# Patient Record
Sex: MALE | Race: WHITE | NOT HISPANIC OR LATINO | Employment: OTHER | URBAN - METROPOLITAN AREA
[De-identification: names, ages, dates, MRNs, and addresses within clinical notes are randomized per-mention and may not be internally consistent; named-entity substitution may affect disease eponyms.]

---

## 2018-01-16 ENCOUNTER — TELEPHONE (OUTPATIENT)
Dept: ORTHOPEDIC SURGERY | Facility: CLINIC | Age: 63
End: 2018-01-16

## 2018-01-18 ENCOUNTER — OFFICE VISIT (OUTPATIENT)
Dept: ORTHOPEDIC SURGERY | Facility: CLINIC | Age: 63
End: 2018-01-18

## 2018-01-18 VITALS — HEIGHT: 69 IN | TEMPERATURE: 97.6 F | WEIGHT: 221 LBS | BODY MASS INDEX: 32.73 KG/M2

## 2018-01-18 DIAGNOSIS — S82.62XA CLOSED DISPLACED FRACTURE OF LATERAL MALLEOLUS OF LEFT FIBULA, INITIAL ENCOUNTER: ICD-10-CM

## 2018-01-18 DIAGNOSIS — S99.912A LEFT ANKLE INJURY, INITIAL ENCOUNTER: Primary | ICD-10-CM

## 2018-01-18 PROCEDURE — 73610 X-RAY EXAM OF ANKLE: CPT | Performed by: ORTHOPAEDIC SURGERY

## 2018-01-18 PROCEDURE — 99204 OFFICE O/P NEW MOD 45 MIN: CPT | Performed by: ORTHOPAEDIC SURGERY

## 2018-01-18 PROCEDURE — 27786 TREATMENT OF ANKLE FRACTURE: CPT | Performed by: ORTHOPAEDIC SURGERY

## 2018-01-18 RX ORDER — RITONAVIR 100 MG/1
100 TABLET, FILM COATED ORAL DAILY
Refills: 3 | COMMUNITY
Start: 2017-12-28

## 2018-01-18 RX ORDER — LISINOPRIL 5 MG/1
5 TABLET ORAL DAILY
Refills: 3 | COMMUNITY
Start: 2017-12-28

## 2018-01-18 RX ORDER — LORATADINE 10 MG/1
1 TABLET ORAL DAILY
Refills: 0 | COMMUNITY
Start: 2017-12-28

## 2018-01-18 RX ORDER — ETRAVIRINE 200 MG/1
1 TABLET ORAL DAILY
Refills: 3 | COMMUNITY
Start: 2017-12-28

## 2018-01-18 RX ORDER — NIACIN 500 MG
500 TABLET ORAL NIGHTLY
COMMUNITY

## 2018-01-18 RX ORDER — RALTEGRAVIR 400 MG/1
2 TABLET, FILM COATED ORAL DAILY
Refills: 3 | COMMUNITY
Start: 2017-12-28

## 2018-01-18 RX ORDER — DIAZEPAM 5 MG/1
5 TABLET ORAL DAILY
Refills: 3 | COMMUNITY
Start: 2017-12-28

## 2018-01-18 RX ORDER — ERGOCALCIFEROL 1.25 MG/1
1 CAPSULE ORAL
Refills: 1 | COMMUNITY
Start: 2017-12-28

## 2018-01-18 RX ORDER — TRAMADOL HYDROCHLORIDE 50 MG/1
TABLET ORAL
Qty: 45 TABLET | Refills: 0 | Status: SHIPPED | OUTPATIENT
Start: 2018-01-18

## 2018-01-18 RX ORDER — LEVOTHYROXINE SODIUM 0.05 MG/1
1 TABLET ORAL DAILY
Refills: 3 | COMMUNITY
Start: 2017-12-28

## 2018-01-18 RX ORDER — PRENATAL WITH FERROUS FUM AND FOLIC ACID 3080; 920; 120; 400; 22; 1.84; 3; 20; 10; 1; 12; 200; 27; 25; 2 [IU]/1; [IU]/1; MG/1; [IU]/1; MG/1; MG/1; MG/1; MG/1; MG/1; MG/1; UG/1; MG/1; MG/1; MG/1; MG/1
1 TABLET ORAL DAILY
Refills: 3 | COMMUNITY
Start: 2017-12-28

## 2018-01-18 RX ORDER — RANITIDINE 300 MG/1
1 TABLET ORAL DAILY
Refills: 0 | COMMUNITY
Start: 2017-12-28

## 2018-01-18 RX ORDER — DARUNAVIR 800 MG/1
800 TABLET, FILM COATED ORAL DAILY
Refills: 3 | COMMUNITY
Start: 2017-12-28

## 2018-01-18 RX ORDER — HYDROCODONE BITARTRATE AND ACETAMINOPHEN 5; 325 MG/1; MG/1
1 TABLET ORAL EVERY 6 HOURS PRN
Qty: 40 TABLET | Refills: 0 | Status: SHIPPED | OUTPATIENT
Start: 2018-01-18 | End: 2018-01-18

## 2018-01-18 RX ORDER — PRAVASTATIN SODIUM 40 MG
20 TABLET ORAL NIGHTLY
Refills: 3 | COMMUNITY
Start: 2017-12-28

## 2018-01-18 RX ORDER — LEVETIRACETAM 500 MG/1
1 TABLET ORAL 2 TIMES DAILY
Refills: 2 | COMMUNITY
Start: 2017-12-28

## 2018-01-18 RX ORDER — CHOLECALCIFEROL (VITAMIN D3) 125 MCG
500 CAPSULE ORAL DAILY
COMMUNITY

## 2018-01-18 RX ORDER — CLOPIDOGREL BISULFATE 75 MG/1
1 TABLET ORAL DAILY
Refills: 0 | COMMUNITY
Start: 2017-12-28

## 2018-01-18 RX ORDER — LANOLIN ALCOHOL/MO/W.PET/CERES
50 CREAM (GRAM) TOPICAL DAILY
COMMUNITY

## 2018-01-18 NOTE — PROGRESS NOTES
New Patient Complaint      Patient: Tacos Mcknight  YOB: 1955 62 y.o. male  Medical Record Number: [id number]    Chief Complaints: I hurt my ankle    History of Present Illness: Patient injured his left ankle and 1/7/18 when he fell at home being up from trying to fix something under his desk.  He continued to use his brace and weight-bear for transfers with persistent severe constant stabbing pain with redness bruising and swelling over the lateral aspect left ankle.  He was seen at [facility] on 1/11/18 diagnosis left ankle fracture.  He is seen here today for further evaluation.    He has a significant history for multiple strokes in the past and has limited use of the left side.  Really is not ambulatory but does stand for transfers and occasionally stands for brief periods of time without assistive devices.          HPI    Allergies:   Allergies   Allergen Reactions   • Iodine    • Shellfish-Derived Products        Medications:   No current outpatient prescriptions on file prior to visit.     No current facility-administered medications on file prior to visit.        Past Medical History:   Diagnosis Date   • Fracture, foot     Great Toe - Right Foot   • HIV (human immunodeficiency virus infection)    • Seizures    • Sleep apnea    • Stroke     x2     Past Surgical History:   Procedure Laterality Date   • BACK SURGERY      lumbar laminectomy   • BRAIN BIOPSY     • TOE SURGERY Right 1990   • TONSILLECTOMY AND ADENOIDECTOMY       Social History     Occupational History   • Not on file.     Social History Main Topics   • Smoking status: Former Smoker     Quit date: 1/18/2009   • Smokeless tobacco: Never Used   • Alcohol use No   • Drug use: No   • Sexual activity: Defer      Social History     Social History Narrative   • No narrative on file     Family History   Problem Relation Age of Onset   • Cancer Mother    • No Known Problems Father    • Cancer Sister      breast   • No Known  "Problems Brother    • No Known Problems Maternal Aunt    • No Known Problems Maternal Uncle    • No Known Problems Paternal Aunt    • No Known Problems Paternal Uncle    • Cancer Maternal Grandmother    • No Known Problems Maternal Grandfather    • Diabetes Paternal Grandmother    • No Known Problems Paternal Grandfather    • Anesthesia problems Neg Hx    • Broken bones Neg Hx    • Clotting disorder Neg Hx    • Collagen disease Neg Hx    • Dislocations Neg Hx    • Osteoporosis Neg Hx    • Rheumatologic disease Neg Hx    • Scoliosis Neg Hx    • Severe sprains Neg Hx        Review of Systems: 14 point review of systems performed, positive pertinent findings identified in HPI. All remaining systems negative Except dry eyes, diarrhea, difficulty sleeping    Review of Systems      Physical Exam:   Vitals:    01/18/18 1409   Temp: 97.6 °F (36.4 °C)   TempSrc: Temporal Artery    Weight: 100 kg (221 lb)   Height: 175.3 cm (69\")     Physical Exam   Constitutional: pleasant, well developed  he is seen with a friend present is seated in a wheelchair  Eyes: sclera non icteric  Hearing : adequate for exam  Cardiovascular: palpable pulses in left foot, left calf/ thigh NT without sign of DVT  Respiratoy: breathing unlabored   Neurological: grossly sensate to LT throughout left LE  Psychiatric: oriented with normal mood and affect.   Lymphatic: No palpable popliteal lymphadenopathy left LE  Skin: intact throughout left leg/foot  Musculoskeletal: There is left ankle shows minimal swelling laterally none medially.  There is tenderness to palpation over the fracture site but no focal pain medially.  There was diminished sensation in the foot but he was able to dorsiflex and plantarflex the ankle as well as the toes to some degree.  Nontender over the dorsum of the midfoot.  Physical Exam  Ortho Exam    Radiology: 3 views left ankle ordered to evaluate alignment reviewed with the patient and his friend and there are no prior x-rays " available for comparison shows a minimally displaced fracture of left lateral malleolus    Assessment/Plan: Left lateral malleolus fracture.  We discussed operative and nonoperative treatment options and he really does not want to have surgery unless was absolutely 100% necessary.  I reviewed this with him that given the time since injury with only minimal amount of displacement and his limited use of the ankle mainly only for standing for brief periods and transferring in a brace that he already has that do not feel that we absolutely had to do surgery.  Also he's been on Plavix for to severe strokes and has never come off of this and he said he would be very reluctant to do so for fear further stroke which is certainly understand.    I think this can be treated nonoperatively from a reasonable standpoint.  He was fitted with a boot today to use as a cast basically and understands to remove it at least every hour or 2 to check his skin.    He was fitted with an air stirrup to sleep with.    He understands if he has further shift or change in alignment may require surgical treatment.    Given a prescription today for tramadol 50 mg 1 by mouth every 6 hours as needed for pain #45 with no refills Controlled substance treatment options discussed in detail.  Patient's signed consent to medical options on file.  GREGORY form in chart.  Risks of narcotic medication usage outlined.  Possibility of physical and psychological dependence and abuse, especially long-term, emphasized to the patient.    Follow-up in 1 week x-rays left ankle

## 2018-01-26 ENCOUNTER — OFFICE VISIT (OUTPATIENT)
Dept: ORTHOPEDIC SURGERY | Facility: CLINIC | Age: 63
End: 2018-01-26

## 2018-01-26 VITALS — HEIGHT: 69 IN | TEMPERATURE: 97.8 F | WEIGHT: 221 LBS | BODY MASS INDEX: 32.73 KG/M2

## 2018-01-26 DIAGNOSIS — S82.62XD CLOSED DISPLACED FRACTURE OF LATERAL MALLEOLUS OF LEFT FIBULA WITH ROUTINE HEALING, SUBSEQUENT ENCOUNTER: Primary | ICD-10-CM

## 2018-01-26 PROCEDURE — 73610 X-RAY EXAM OF ANKLE: CPT | Performed by: ORTHOPAEDIC SURGERY

## 2018-01-26 PROCEDURE — 99024 POSTOP FOLLOW-UP VISIT: CPT | Performed by: ORTHOPAEDIC SURGERY

## 2018-01-26 NOTE — PROGRESS NOTES
"Ankle Follow Up      Patient: Tacos Mcknight    YOB: 1955 62 y.o. male    Chief Complaints: Ankle pain    History of Present Illness: Injured left ankle on 1/7/18 seen here on 1/18/18.  That time reviewed treatment options with neutral decision made to treat this nonoperatively.  Since then he said his pain is now gone he's been using his boot and really only stands for transfers.  HPI    ROS: No ankle pain  Past Medical History:   Diagnosis Date   • Fracture, foot     Great Toe - Right Foot   • HIV (human immunodeficiency virus infection)    • Seizures    • Sleep apnea    • Stroke     x2       Physical Exam:   Vitals:    01/26/18 1310   Temp: 97.8 °F (36.6 °C)   TempSrc: Temporal Artery    Weight: 100 kg (221 lb)   Height: 175.3 cm (69\")     Well developed with normal mood.He is with his friend.  Left ankle shows skin to be intact throughout the ankle.  Minimal if any swelling and no focal tenderness to palpation.  Left calf is non-tender without swelling or sign of DVT      Radiology: 3 views left ankle ordered to evaluate fracture reviewed and compared with previous x-rays.  There is been no change in alignment to the distal fibular fracture and I do not appreciate significant widening medially or significant lateral translation of the talus.      Assessment/Plan:  1.  Nonoperative treatment left distal fibula fracture.      The time since his injury as well as the overall alignment I would not recommend surgical treatment at this time.    He'll continue with his boot for standing and transfer activities and understands to check his skin every hour or 2.    He says the air stirrup has been coming off at night.  He may try to adjust this really put a much larger sock over it.    I will see him back in 2 weeks' x-rays of his left ankle  "

## 2018-02-09 ENCOUNTER — OFFICE VISIT (OUTPATIENT)
Dept: ORTHOPEDIC SURGERY | Facility: CLINIC | Age: 63
End: 2018-02-09

## 2018-02-09 VITALS — TEMPERATURE: 97.8 F | HEIGHT: 69 IN | BODY MASS INDEX: 32.73 KG/M2 | WEIGHT: 221 LBS

## 2018-02-09 DIAGNOSIS — S82.62XD CLOSED DISPLACED FRACTURE OF LATERAL MALLEOLUS OF LEFT FIBULA WITH ROUTINE HEALING, SUBSEQUENT ENCOUNTER: Primary | ICD-10-CM

## 2018-02-09 PROCEDURE — 99024 POSTOP FOLLOW-UP VISIT: CPT | Performed by: ORTHOPAEDIC SURGERY

## 2018-02-09 PROCEDURE — 73610 X-RAY EXAM OF ANKLE: CPT | Performed by: ORTHOPAEDIC SURGERY

## 2018-02-09 NOTE — PROGRESS NOTES
"Ankle Follow Up      Patient: Tacos Mcknight    YOB: 1955 62 y.o. male    Chief Complaints: Ankle pain    History of Present Illness: Follows up injury left ankle from 1/7/18 with initial evaluation on 1/18/18.  He was last seen on 1/26/18.  Since then he's only been using the foot to stand with the boot for transfers and to go to the bathroom as is his normal preinjury routine.  He has no complaints of pain at the ankle but does have numbness that had been having some pain that is now resolved.  HPI    ROS: No ankle pain  Past Medical History:   Diagnosis Date   • Fracture, foot     Great Toe - Right Foot   • HIV (human immunodeficiency virus infection)    • Seizures    • Sleep apnea    • Stroke     x2       Physical Exam:   Vitals:    02/09/18 1254   Temp: 97.8 °F (36.6 °C)   TempSrc: Temporal Artery    Weight: 100 kg (221 lb)   Height: 175.3 cm (69\")     Well developed with normal mood.He is with a friend.  Minimal if any swelling about the left ankle.  Left calf without swelling.  There is diminished sensation left ankle but no focal pain to palpation medially or laterally.      Radiology: Reviews left ankle ordered to eval a fracture reviewed and compared to previous x-rays.  I don't see any significant change in alignment to the distal fibula fracture there is slight callus forming at this point.        Assessment/Plan:  Left lateral malleolus fracture nonoperative treatment.    We discussed treatment options and given his limited activity as well as the lack of change in alignment to the fracture early callus would not recommend surgical treatment at this time nor does he want to have any at this time.    He understands that should he have change in alignment or developed nonunion that he may require open reduction internal fixation.    We'll have him continue with his boot for now with current level of activity and I will see him back in 3 weeks' x-rays of his left ankle  "

## 2018-03-02 ENCOUNTER — OFFICE VISIT (OUTPATIENT)
Dept: ORTHOPEDIC SURGERY | Facility: CLINIC | Age: 63
End: 2018-03-02

## 2018-03-02 VITALS — WEIGHT: 221 LBS | HEIGHT: 69 IN | TEMPERATURE: 98 F | BODY MASS INDEX: 32.73 KG/M2

## 2018-03-02 DIAGNOSIS — S82.62XD CLOSED DISPLACED FRACTURE OF LATERAL MALLEOLUS OF LEFT FIBULA WITH ROUTINE HEALING, SUBSEQUENT ENCOUNTER: Primary | ICD-10-CM

## 2018-03-02 PROCEDURE — 73610 X-RAY EXAM OF ANKLE: CPT | Performed by: ORTHOPAEDIC SURGERY

## 2018-03-02 PROCEDURE — 99024 POSTOP FOLLOW-UP VISIT: CPT | Performed by: ORTHOPAEDIC SURGERY

## 2018-03-02 NOTE — PROGRESS NOTES
"Ankle Follow Up      Patient: Tacos Mcknight    YOB: 1955 62 y.o. male    Chief Complaints: Ankle doing okay    History of Present Illness: Left ankle injury 1/7/18, initial evaluation 1/18/18 with nonoperative treatment of left lateral malleolus fracture.    His baseline activity is non-ambulatory and stands to transfer using his right leg..  He is been using his boot the majority of the time  HPI    ROS: no ankle pain  Past Medical History:   Diagnosis Date   • Fracture, foot     Great Toe - Right Foot   • HIV (human immunodeficiency virus infection)    • Seizures    • Sleep apnea    • Stroke     x2       Physical Exam:   Vitals:    03/02/18 1347   Temp: 98 °F (36.7 °C)   TempSrc: Temporal Artery    Weight: 100 kg (221 lb)  Comment: per record. unable to weigh, patient wheelchair bound   Height: 175.3 cm (69\")     Well developed with normal mood.He is with his friend.  Left ankle shows minimal if any swelling.  Diminished sensation to the left ankle but no instability on gentle stress testing.  No focal tenderness to palpation      Radiology: 3 views left ankle ordered to evaluate fracture reviewed and compared to previous x-rays.  There is been no change in alignment to the talus within the mortise or to the distal fibular fracture does appear to be some increased callus formation      Assessment/Plan:  Nonoperative treatment left lateral malleolus fracture.    Given his extremely limited activity and lack of change in alignment I would not recommend surgical treatment.  He will continue with his boot for transfers or when he is mobilizing in a wheelchair.  At rest otherwise he may leave it off.    I will check him in 3 weeks' x-rays of his left ankle.  "

## 2018-03-26 ENCOUNTER — OFFICE VISIT (OUTPATIENT)
Dept: ORTHOPEDIC SURGERY | Facility: CLINIC | Age: 63
End: 2018-03-26

## 2018-03-26 VITALS — TEMPERATURE: 98.2 F | HEIGHT: 69 IN | WEIGHT: 221 LBS | BODY MASS INDEX: 32.73 KG/M2

## 2018-03-26 DIAGNOSIS — S82.62XD CLOSED DISPLACED FRACTURE OF LATERAL MALLEOLUS OF LEFT FIBULA WITH ROUTINE HEALING, SUBSEQUENT ENCOUNTER: Primary | ICD-10-CM

## 2018-03-26 PROCEDURE — 73610 X-RAY EXAM OF ANKLE: CPT | Performed by: ORTHOPAEDIC SURGERY

## 2018-03-26 PROCEDURE — 99024 POSTOP FOLLOW-UP VISIT: CPT | Performed by: ORTHOPAEDIC SURGERY

## 2018-03-26 NOTE — PROGRESS NOTES
"Ankle Follow Up      Patient: Tacos Mcknight    YOB: 1955 62 y.o. male    Chief Complaints: Ankle doing well    History of Present Illness: Follows up left ankle injury from 1/7/18 with initial evaluate on 1/18/18 with nonoperative treatment of left lateral malleolus fracture.  Last seen on 3-18 he is continue to use his boot for transfers using his right leg he is essentially nonambulatory  HPI    ROS: ankle pain  Past Medical History:   Diagnosis Date   • Fracture, foot     Great Toe - Right Foot   • HIV (human immunodeficiency virus infection)    • Seizures    • Sleep apnea    • Stroke     x2       Physical Exam:   Vitals:    03/26/18 1238   Temp: 98.2 °F (36.8 °C)   TempSrc: Temporal Artery    Weight: 100 kg (221 lb)   Height: 175.3 cm (69.02\")     Well developed with normal mood.he is with a friend.  There is no swelling to left ankle has diminished sensation but grossly sensate tolight touch with no pain to palpation or with external rotation testing.      Radiology:3 views left ankle ordered to evaluate alignment reviewed and compared with previous x-rays fracture line is still somewhat evident but there is been interval callus formation and no widening of the syndesmosis or lateral shift of the talus      Assessment/Plan:  Non-Operative treatment left lateral malleolus fracture.  Overall is doing quite well with talked about transitioning to a smaller brace.  Only has use of one hand and has difficulty doing any other type of brace but can get his boot on and will continue to use it as such for transfers.  Understands to check his skin regularly I will see him back in 4 weeks' x-rays of his left ankle  "

## 2018-04-23 ENCOUNTER — OFFICE VISIT (OUTPATIENT)
Dept: ORTHOPEDIC SURGERY | Facility: CLINIC | Age: 63
End: 2018-04-23

## 2018-04-23 VITALS — WEIGHT: 220 LBS | BODY MASS INDEX: 31.5 KG/M2 | HEIGHT: 70 IN | TEMPERATURE: 97.4 F

## 2018-04-23 DIAGNOSIS — Z87.81 HISTORY OF FRACTURE: Primary | ICD-10-CM

## 2018-04-23 DIAGNOSIS — S82.62XD CLOSED DISPLACED FRACTURE OF LATERAL MALLEOLUS OF LEFT FIBULA WITH ROUTINE HEALING, SUBSEQUENT ENCOUNTER: ICD-10-CM

## 2018-04-23 PROCEDURE — 99212 OFFICE O/P EST SF 10 MIN: CPT | Performed by: ORTHOPAEDIC SURGERY

## 2018-04-23 PROCEDURE — 73610 X-RAY EXAM OF ANKLE: CPT | Performed by: ORTHOPAEDIC SURGERY

## 2018-04-23 NOTE — PROGRESS NOTES
"Ankle Follow Up      Patient: Tacos Mcknight    YOB: 1955 62 y.o. male    Chief Complaints: Ankle doing okay    History of Present Illness: Nonoperative treatment left lateral malleolus fracture that occurred on 1/7/18 with initial evaluation on 1/18/18.  He was last seen on 3/26/18.  Since then he has continued to do transfers using his boot and has been wearing the AFO that he previously wore prior to this injury at night.  He is essentially nonambulatory and only transfers to the wheelchair he has history of numbness in this left leg.  HPI    ROS: No ankle pain swelling  Past Medical History:   Diagnosis Date   • Fracture, foot     Great Toe - Right Foot   • HIV (human immunodeficiency virus infection)    • Seizures    • Sleep apnea    • Stroke     x2       Physical Exam:   Vitals:    04/23/18 1249   Temp: 97.4 °F (36.3 °C)   Weight: 99.8 kg (220 lb)   Height: 177.8 cm (70\")   PainSc: 0-No pain   PainLoc: Ankle     Well developed with normal mood.Left ankle.  There is diminished sensation but grossly sensate to light touch.  No pain to palpation or with external rotation testing or 2 dorsiflexion plantarflexion of the ankle.      Radiology: 3 Views left ankle ordered to evaluate fracture alignment reviewed and compared with previous x-rays.  There is no widening of the syndesmosis or lateral shift of the talus.  Fracture line is still somewhat evident but there is callus formation      Assessment/Plan:  Left lateral malleolus fracture nonoperative treatment with probable delayed union.  We discussed these findings and given his lack of pain and ability to perform activities that he was prior to this injury would not recommend surgical treatment.  He may start weaning out of the boot and to use the AFO that he previously used for transfers.  I counseled he and his friend that they notice any increase in pain or swelling or change in alignment to the medial right away otherwise I'll see him back in 6 " weeks' x-rays left ankle

## 2018-06-04 ENCOUNTER — OFFICE VISIT (OUTPATIENT)
Dept: ORTHOPEDIC SURGERY | Facility: CLINIC | Age: 63
End: 2018-06-04

## 2018-06-04 VITALS — BODY MASS INDEX: 31.07 KG/M2 | TEMPERATURE: 97.6 F | HEIGHT: 70 IN | WEIGHT: 217 LBS

## 2018-06-04 DIAGNOSIS — S82.62XD CLOSED DISPLACED FRACTURE OF LATERAL MALLEOLUS OF LEFT FIBULA WITH ROUTINE HEALING, SUBSEQUENT ENCOUNTER: Primary | ICD-10-CM

## 2018-06-04 PROCEDURE — 73610 X-RAY EXAM OF ANKLE: CPT | Performed by: ORTHOPAEDIC SURGERY

## 2018-06-04 PROCEDURE — 99212 OFFICE O/P EST SF 10 MIN: CPT | Performed by: ORTHOPAEDIC SURGERY

## 2018-06-04 NOTE — PROGRESS NOTES
Ankle Follow Up      Patient: Tacos Mcknight    YOB: 1955 62 y.o. male    Chief Complaints: Ankle doing okay     History of Present Illness:Nonoperative treatment left lateral malleolus fracture that occurred on 1/7/18 with initial evaluation on 1/18/18.   he was last seen on 4/23/18 at which point he was without pain.  He is essentially nonambulatory only transfers to his wheelchair.  At our last visit recommendations are made wean out of the boot and resume use of the AFO.  However he has continued to use the boot because he misunderstood my directions.  He has no complaints of pain to the ankle  HPI    ROS: No ankle pain  Past Medical History:   Diagnosis Date   • Fracture, foot     Great Toe - Right Foot   • HIV (human immunodeficiency virus infection)    • Seizures    • Sleep apnea    • Stroke     x2       Physical Exam:   There were no vitals filed for this visit.  Well developed with normal mood.He is with his friend.  There was no swelling warmth or erythema about the ankle.  He did demonstrate gross sensation to light touch and had no pain to the ankle to palpation nor with external rotation testing.      Radiology: 3Views left ankle ordered to evaluate fracture alignment reviewed and compared with previous x-rays.  I don't see any widening of the syndesmosis.  Fractures without change in alignment there is some callus formation but fracture line is still somewhat persistent      Assessment/Plan:  Left lateral malleolus fracture nonoperative treatment with delayed union..  He has an asymptomatic fibrous nonunion or partial union    We discussed treatment options and he does not want to have anything done from a surgical standpoint he's not having any pain or deformity and is non-ambulatory other than transferring in and AFO brace.    She will wean out of his boot into the AFO brace and continue with transfers.  If he develops any increase in swelling or recurrence of pain he will let me know  "otherwise I will see him back as needed.    He told me he thought I was \"wonderful \".  "

## 2020-11-19 ENCOUNTER — TRANSCRIBE ORDERS (OUTPATIENT)
Dept: ADMINISTRATIVE | Facility: HOSPITAL | Age: 65
End: 2020-11-19

## 2020-11-19 DIAGNOSIS — R53.1 LEFT-SIDED WEAKNESS: Primary | ICD-10-CM

## 2020-11-19 DIAGNOSIS — Z86.73 HISTORY OF STROKE: ICD-10-CM

## 2020-12-11 ENCOUNTER — HOSPITAL ENCOUNTER (OUTPATIENT)
Dept: MRI IMAGING | Facility: HOSPITAL | Age: 65
Discharge: HOME | End: 2020-12-11
Admitting: PHYSICAL MEDICINE & REHABILITATION

## 2020-12-11 DIAGNOSIS — Z86.73 HISTORY OF STROKE: ICD-10-CM

## 2020-12-11 DIAGNOSIS — R53.1 LEFT-SIDED WEAKNESS: ICD-10-CM

## 2020-12-11 PROCEDURE — 70551 MRI BRAIN STEM W/O DYE: CPT

## 2020-12-14 ENCOUNTER — HOSPITAL ENCOUNTER (OUTPATIENT)
Dept: MRI IMAGING | Facility: HOSPITAL | Age: 65
Discharge: HOME | End: 2020-12-14

## 2020-12-14 ENCOUNTER — TRANSCRIBE ORDERS (OUTPATIENT)
Dept: ADMINISTRATIVE | Facility: HOSPITAL | Age: 65
End: 2020-12-14

## 2020-12-14 DIAGNOSIS — R53.1 WEAKNESS: ICD-10-CM

## 2020-12-14 DIAGNOSIS — Z86.73 HISTORY OF STROKE: ICD-10-CM

## 2020-12-14 DIAGNOSIS — R53.1 WEAKNESS: Primary | ICD-10-CM
